# Patient Record
Sex: FEMALE | Race: WHITE | NOT HISPANIC OR LATINO | URBAN - METROPOLITAN AREA
[De-identification: names, ages, dates, MRNs, and addresses within clinical notes are randomized per-mention and may not be internally consistent; named-entity substitution may affect disease eponyms.]

---

## 2024-06-18 ENCOUNTER — APPOINTMENT (OUTPATIENT)
Dept: OPHTHALMOLOGY | Facility: CLINIC | Age: 64
End: 2024-06-18
Payer: COMMERCIAL

## 2024-06-18 DIAGNOSIS — H52.4 PRESBYOPIA: ICD-10-CM

## 2024-06-18 DIAGNOSIS — M35.3 PMR (POLYMYALGIA RHEUMATICA) (MULTI): Primary | ICD-10-CM

## 2024-06-18 DIAGNOSIS — H52.223 REGULAR ASTIGMATISM, BILATERAL: ICD-10-CM

## 2024-06-18 PROCEDURE — 92015 DETERMINE REFRACTIVE STATE: CPT | Performed by: OPHTHALMOLOGY

## 2024-06-18 PROCEDURE — 92004 COMPRE OPH EXAM NEW PT 1/>: CPT | Performed by: OPHTHALMOLOGY

## 2024-06-18 RX ORDER — PREDNISONE 5 MG/1
5 TABLET ORAL
COMMUNITY
Start: 2024-05-22 | End: 2024-06-21

## 2024-06-18 RX ORDER — METFORMIN HYDROCHLORIDE 500 MG/1
1000 TABLET, EXTENDED RELEASE ORAL DAILY
COMMUNITY

## 2024-06-18 RX ORDER — LISINOPRIL 10 MG/1
10 TABLET ORAL DAILY
COMMUNITY

## 2024-06-18 ASSESSMENT — REFRACTION_MANIFEST
OS_AXIS: 178
METHOD_AUTOREFRACTION: 1
OD_CYLINDER: -0.50
OS_CYLINDER: -1.50
OD_AXIS: 025
OS_SPHERE: -0.50
OS_SPHERE: -0.25
OD_SPHERE: +0.50
OD_SPHERE: +0.50
OS_AXIS: 178
OD_CYLINDER: -0.50
OS_ADD: +2.50
OD_AXIS: 024
OD_ADD: +2.50
OS_CYLINDER: -1.50

## 2024-06-18 ASSESSMENT — REFRACTION_WEARINGRX
OS_SPHERE: -0.75
OD_SPHERE: +0.50
OD_ADD: +2.50
OS_CYLINDER: -2.25
OD_CYLINDER: -0.25
OS_AXIS: 015
OS_ADD: +2.50
OD_AXIS: 020

## 2024-06-18 ASSESSMENT — TONOMETRY
OS_IOP_MMHG: 15
OD_IOP_MMHG: 14
IOP_METHOD: GOLDMANN APPLANATION

## 2024-06-18 ASSESSMENT — EXTERNAL EXAM - LEFT EYE: OS_EXAM: NORMAL

## 2024-06-18 ASSESSMENT — CUP TO DISC RATIO
OS_RATIO: .2
OD_RATIO: .2

## 2024-06-18 ASSESSMENT — VISUAL ACUITY
METHOD: SNELLEN - LINEAR
OD_CC: 20/20
OS_CC: J2
OS_CC: 20/30
OD_CC: J2

## 2024-06-18 ASSESSMENT — SLIT LAMP EXAM - LIDS
COMMENTS: GOOD POSITION
COMMENTS: GOOD POSITION

## 2024-06-18 ASSESSMENT — ENCOUNTER SYMPTOMS
RESPIRATORY NEGATIVE: 0
HEMATOLOGIC/LYMPHATIC NEGATIVE: 0
CONSTITUTIONAL NEGATIVE: 0
ALLERGIC/IMMUNOLOGIC NEGATIVE: 0
NEUROLOGICAL NEGATIVE: 0
GASTROINTESTINAL NEGATIVE: 0
EYES NEGATIVE: 1
CARDIOVASCULAR NEGATIVE: 0
MUSCULOSKELETAL NEGATIVE: 0
ENDOCRINE NEGATIVE: 0
PSYCHIATRIC NEGATIVE: 0

## 2024-06-18 ASSESSMENT — EXTERNAL EXAM - RIGHT EYE: OD_EXAM: NORMAL

## 2024-06-18 NOTE — PROGRESS NOTES
Assessment/Plan   Diagnoses and all orders for this visit:  PMR (polymyalgia rheumatica) (Multi)  No adverse effects from prednisone  Regular astigmatism, bilateral    Presbyopia  Glasses prescription given to patient today -Followup in 1 year or as needed for symptoms (RSVP: redness, sensitivity to light, vision loss, pain)

## 2024-08-08 ENCOUNTER — OFFICE VISIT (OUTPATIENT)
Dept: OPHTHALMOLOGY | Facility: CLINIC | Age: 64
End: 2024-08-08
Payer: COMMERCIAL

## 2024-08-08 DIAGNOSIS — H52.223 REGULAR ASTIGMATISM, BILATERAL: Primary | ICD-10-CM

## 2024-08-08 ASSESSMENT — ENCOUNTER SYMPTOMS
CARDIOVASCULAR NEGATIVE: 0
RESPIRATORY NEGATIVE: 0
EYES NEGATIVE: 1
ALLERGIC/IMMUNOLOGIC NEGATIVE: 0
NEUROLOGICAL NEGATIVE: 0
GASTROINTESTINAL NEGATIVE: 0
CONSTITUTIONAL NEGATIVE: 0
ENDOCRINE NEGATIVE: 0
PSYCHIATRIC NEGATIVE: 0
MUSCULOSKELETAL NEGATIVE: 0
HEMATOLOGIC/LYMPHATIC NEGATIVE: 0

## 2024-08-08 ASSESSMENT — REFRACTION_WEARINGRX
OS_SPHERE: -0.25
OD_SPHERE: +0.50
OS_AXIS: 171
OD_AXIS: 032
OD_ADD: +3.00
OS_CYLINDER: -1.00
OD_CYLINDER: -0.50
SPECS_TYPE: PAL
OS_ADD: +3.00

## 2024-08-08 ASSESSMENT — REFRACTION_MANIFEST
OS_ADD: +3.00
OS_AXIS: 001
OD_AXIS: 020
OD_CYLINDER: -0.25
OD_SPHERE: +0.50
OS_CYLINDER: -2.00
OS_SPHERE: -0.25
OD_AXIS: 018
OD_ADD: +3.00
OS_SPHERE: -0.25
OD_CYLINDER: -0.75
METHOD_AUTOREFRACTION: 1
OS_CYLINDER: -2.00
OS_AXIS: 180
OD_SPHERE: +0.50

## 2024-08-08 ASSESSMENT — TONOMETRY
OD_IOP_MMHG: CONTRA
OS_IOP_MMHG: CONTRA

## 2024-08-08 ASSESSMENT — VISUAL ACUITY
OS_CC: 20/25
OS_CC+: -1
METHOD: SNELLEN - LINEAR
OD_CC: 20/20
CORRECTION_TYPE: GLASSES

## 2025-06-26 ENCOUNTER — APPOINTMENT (OUTPATIENT)
Dept: OPHTHALMOLOGY | Facility: CLINIC | Age: 65
End: 2025-06-26
Payer: COMMERCIAL

## 2025-12-15 ENCOUNTER — APPOINTMENT (OUTPATIENT)
Dept: OPHTHALMOLOGY | Facility: CLINIC | Age: 65
End: 2025-12-15
Payer: COMMERCIAL